# Patient Record
(demographics unavailable — no encounter records)

---

## 2025-01-22 NOTE — ASSESSMENT
[FreeTextEntry1] : 61 yr old female w/ PMHx anemia, sleep apnea, submucosal glomangioma, gerd, high platelet, pancreatic cyst presenting w/ watery eyes and runny nose and sinus pressure for the last wk.   #acute sinusitis  -pt feeling congestion, watery eyes for the last week  -denies fevers, chills, cough, SOB  -tried Claritin and flonase w/o relief  -w/ penicillin allergy so will send doxycycline 100mg BID for 7 days  -if pt symptoms persist after antibiotic course then pt may need allergy testing

## 2025-01-22 NOTE — HISTORY OF PRESENT ILLNESS
[FreeTextEntry8] : 61 yr old female w/ PMHx anemia, sleep apnea, submucosal glomangioma, gerd, high platelet, pancreatic cyst has been feeling watery eyes and runny nose. She states she has itchy throat. Has taken Claritin but that didnt help. Denies fevers, sore throat. no cough. SOB, headaches. She states these symptoms have been happening for the last week. she has discharge coming out of both eyes white color. no crusting in the morning. She states her daughter recently got a dog and she may be allergic, but this has not happened before. Has been feeling sinus pressure. has started hydroxyurea 500mg once every 3 days started in November 2024.

## 2025-01-22 NOTE — PHYSICAL EXAM
[TextEntry] : GEN: Healthy appearing, well-developed, NAD. HEAD: NC/AT;  ENT: No discharge or redness; External ears are normal. Normal TMs. Normal nares. tenderness to palpation of maxillary and frontal sinuses  NECK: Supple, with no masses. CV: RRR, no m/r/g. LUNGS: CTAB, no w/r/c. SKIN: Warm, well perfused. No skin rashes or abnormal lesions. EXT: No clubbing, cyanosis, or edema. NEURO: Ambulating with no limitations. Normal muscle strength and tone. No focal deficits. PSYCH: Good Judgment. AOx3. Normal memory, mood, and affect.

## 2025-01-22 NOTE — REVIEW OF SYSTEMS
[TextEntry] : General: No fever, no chills, no weight change.  Ocular: + drainage, no blurred vision.  HEENT: No sore throat, earache, +congestion. No neck pain.  Cardio: No chest pain. No palpitations.  Lungs: No shortness of breath or cough. No wheezing.  GI: No nausea, no vomiting, no diarrhea, no constipation, no anorexia.  Musculoskeletal: No joint pain or swelling or edema.  Skin: No rash or itching.

## 2025-05-24 NOTE — HISTORY OF PRESENT ILLNESS
[FreeTextEntry8] : Patient is a 61 year old female with past medical history of leukemia, vitamin D deficiency who presents for evaluation of left hip pain. She states that hip pain began approximately a year ago, but has been recently worsening in severity. Pain is worse at night and occasionally wakes her up from sleep. She takes Advil to help alleviate the pain with minimal improvement. She takes gabapentin nightly. 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 Denies any trauma to area. No pain elsewhere. Reports her pain today is 7/10. She notes she was diagnosed with leukemia and has been on hydroxyurea 500mg for the past 5 months, following with heme/onc Dr. Hammer at [facility]. She is taking supplemental vitamin D for hx of deficiency and reports a normal DEXA scan 6/2024

## 2025-05-24 NOTE — PHYSICAL EXAM
[Normal] : soft, non-tender, non-distended, no masses palpated, no HSM and normal bowel sounds [Normal Sclera/Conjunctiva] : normal sclera/conjunctiva [Normal Outer Ear/Nose] : the outer ears and nose were normal in appearance [Supple] : supple [No Edema] : there was no peripheral edema [Soft] : abdomen soft [Non Tender] : non-tender [Non-distended] : non-distended [No Spinal Tenderness] : no spinal tenderness [Grossly Normal Strength/Tone] : grossly normal strength/tone [No Rash] : no rash [No Focal Deficits] : no focal deficits [Normal Gait] : normal gait [Normal Affect] : the affect was normal [Alert and Oriented x3] : oriented to person, place, and time [Normal Mood] : the mood was normal [Normal Insight/Judgement] : insight and judgment were intact [de-identified] : Mild tenderness to palpation of lateral femur

## 2025-05-24 NOTE — ASSESSMENT
[FreeTextEntry1] : Patient is a 61 year old female with past medical history of leukemia, vitamin D deficiency who presents for evaluation of left hip pain.  #Left hip pain - In setting of hx malignancy, obtain left hip XR to r/o bony mets - Prescribed Topical diclofenac gel for pain control  in addition to as needed Tylenol and Advil - Advised to follow up with heme/onc - RTC 4-6 wks or sooner if symptoms worsen

## 2025-05-24 NOTE — PHYSICAL EXAM
[Normal] : soft, non-tender, non-distended, no masses palpated, no HSM and normal bowel sounds [Normal Sclera/Conjunctiva] : normal sclera/conjunctiva [Normal Outer Ear/Nose] : the outer ears and nose were normal in appearance [Supple] : supple [No Edema] : there was no peripheral edema [Soft] : abdomen soft [Non Tender] : non-tender [Non-distended] : non-distended [No Spinal Tenderness] : no spinal tenderness [Grossly Normal Strength/Tone] : grossly normal strength/tone [No Rash] : no rash [No Focal Deficits] : no focal deficits [Normal Gait] : normal gait [Normal Affect] : the affect was normal [Alert and Oriented x3] : oriented to person, place, and time [Normal Mood] : the mood was normal [Normal Insight/Judgement] : insight and judgment were intact [de-identified] : Mild tenderness to palpation of lateral femur

## 2025-06-23 NOTE — PHYSICAL EXAM
[No Acute Distress] : no acute distress [Well Nourished] : well nourished [Well Developed] : well developed [Well-Appearing] : well-appearing [Normal Sclera/Conjunctiva] : normal sclera/conjunctiva [PERRL] : pupils equal round and reactive to light [EOMI] : extraocular movements intact [No Respiratory Distress] : no respiratory distress  [No Accessory Muscle Use] : no accessory muscle use [Clear to Auscultation] : lungs were clear to auscultation bilaterally [Normal Rate] : normal rate  [Regular Rhythm] : with a regular rhythm [Normal S1, S2] : normal S1 and S2 [Soft] : abdomen soft [Non Tender] : non-tender [Non-distended] : non-distended [Normal Bowel Sounds] : normal bowel sounds [No Joint Swelling] : no joint swelling [Grossly Normal Strength/Tone] : grossly normal strength/tone [No Rash] : no rash [No Focal Deficits] : no focal deficits [Normal Gait] : normal gait [Normal Affect] : the affect was normal [Normal Insight/Judgement] : insight and judgment were intact

## 2025-07-03 NOTE — HISTORY OF PRESENT ILLNESS
[FreeTextEntry8] : Patient is a 61 year old female with past medical history of leukemia, vitamin D deficiency who presents for evaluation of left hip pain. She states that hip pain began approximately a year ago, but has been recently worsening in severity. Pain is worse at night and occasionally wakes her up from sleep. She takes Advil to help alleviate the pain with minimal improvement. She takes gabapentin nightly. 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 Denies any trauma to area. No pain elsewhere. Reports her pain today is 7/10. She notes she was diagnosed with leukemia and has been on hydroxyurea 500mg for the past 5 months, following with heme/onc Dr. Hammer at [facility]. She is taking supplemental vitamin D for hx of deficiency and reports a normal DEXA scan 6/2024 no